# Patient Record
Sex: FEMALE | Race: WHITE | Employment: FULL TIME | URBAN - NONMETROPOLITAN AREA
[De-identification: names, ages, dates, MRNs, and addresses within clinical notes are randomized per-mention and may not be internally consistent; named-entity substitution may affect disease eponyms.]

---

## 2017-10-09 ENCOUNTER — HOSPITAL ENCOUNTER (OUTPATIENT)
Age: 37
Setting detail: SPECIMEN
Discharge: HOME | End: 2017-10-09
Payer: COMMERCIAL

## 2017-10-09 ENCOUNTER — OFFICE VISIT (OUTPATIENT)
Dept: OBGYN | Age: 37
End: 2017-10-09
Payer: COMMERCIAL

## 2017-10-09 ENCOUNTER — NURSE ONLY (OUTPATIENT)
Dept: LAB | Age: 37
End: 2017-10-09
Payer: COMMERCIAL

## 2017-10-09 VITALS
HEART RATE: 60 BPM | SYSTOLIC BLOOD PRESSURE: 106 MMHG | WEIGHT: 152.8 LBS | DIASTOLIC BLOOD PRESSURE: 70 MMHG | HEIGHT: 63 IN | BODY MASS INDEX: 27.07 KG/M2

## 2017-10-09 DIAGNOSIS — Z23 NEED FOR PROPHYLACTIC VACCINATION WITH COMBINED DIPHTHERIA-TETANUS-PERTUSSIS (DTP) VACCINE: Primary | ICD-10-CM

## 2017-10-09 DIAGNOSIS — Z01.419 ROUTINE GYNECOLOGICAL EXAMINATION: ICD-10-CM

## 2017-10-09 DIAGNOSIS — Z01.419 ROUTINE GYNECOLOGICAL EXAMINATION: Primary | ICD-10-CM

## 2017-10-09 PROCEDURE — 99395 PREV VISIT EST AGE 18-39: CPT | Performed by: NURSE PRACTITIONER

## 2017-10-09 PROCEDURE — 90471 IMMUNIZATION ADMIN: CPT | Performed by: NURSE PRACTITIONER

## 2017-10-09 PROCEDURE — G0145 SCR C/V CYTO,THINLAYER,RESCR: HCPCS

## 2017-10-09 PROCEDURE — 90715 TDAP VACCINE 7 YRS/> IM: CPT | Performed by: NURSE PRACTITIONER

## 2017-10-09 RX ORDER — FAMOTIDINE 20 MG/1
20 TABLET, FILM COATED ORAL NIGHTLY PRN
COMMUNITY
End: 2019-10-10

## 2017-10-09 RX ORDER — LISINOPRIL 20 MG/1
10 TABLET ORAL DAILY
COMMUNITY
End: 2019-10-10 | Stop reason: DRUGHIGH

## 2017-10-09 RX ORDER — VALACYCLOVIR HYDROCHLORIDE 500 MG/1
500 TABLET, FILM COATED ORAL DAILY
Qty: 90 TABLET | Refills: 5 | Status: SHIPPED | OUTPATIENT
Start: 2017-10-09 | End: 2018-10-09 | Stop reason: SDUPTHER

## 2017-10-09 NOTE — PROGRESS NOTES
Paras Smith  10/9/2017              29 y.o. Chief Complaint   Patient presents with    Gynecologic Exam     annual exam and pap         Patient's last menstrual period was 2017 (exact date). No referring provider defined for this encounter. HPI :Annual Exam  Patient presents for annual exam.  Counseling on healthy lifestyle reviewed, as well as the need for self breast exam.  We discussed calcium and vitamin D with emphasis on dietary calcium as opposed to large quantities of supplements and the patient verbalized understanding. We discussed and reviewed the need for routine screenings and immunization updates when appropriate. Menses every month lasting 5 days with no heavy flow or pain. History of genital herpes for which she takes Valtrex 500 mg daily. No outbreaks in the past year    Performs monthly self breast exams. The patient is sexually active. last pap: was normal  The patient has regular exercise: yes-3 times weekly . We did review the need for and frequency of both weight bearing and strengthening as tolerated by the patient. ________________________________________________________________________  Obstetric History       T0      L1     SAB0   TAB0   Ectopic0   Molar0   Multiple0   Live Births0       # Outcome Date GA Lbr Joe/2nd Weight Sex Delivery Anes PTL Lv   2      F CS-Unspec      1 SAB                 Past Medical History:   Diagnosis Date    Lupus ([address]      [id number] [address]                                                                   Past Surgical History:   Procedure Laterality Date     SECTION      DILATION AND CURETTAGE OF UTERUS      MISCARRIAGE    KIDNEY BIOPSY      X 2     Family History   Problem Relation Age of Onset    Other Mother 61     Postmenopausal bleeding-\"precancerous\" endometrial biopsy.   Had D&C     Social History     Social History    Marital status:      Spouse name: N/A    Number of children: N/A    Years of education: N/A     Occupational History    Not on file. Social History Main Topics    Smoking status: Never Smoker    Smokeless tobacco: Never Used    Alcohol use Yes      Comment: socially    Drug use: No    Sexual activity: Yes     Partners: Male     Birth control/ protection: Condom      Comment: Menarche age 16     Other Topics Concern    Not on file     Social History Narrative    No narrative on file       MEDICATIONS:  Current Outpatient Prescriptions   Medication Sig Dispense Refill    lisinopril (PRINIVIL;ZESTRIL) 20 MG tablet Take 20 mg by mouth daily      famotidine (PEPCID) 20 MG tablet Take 20 mg by mouth nightly as needed      valACYclovir (VALTREX) 500 MG tablet Take 1 tablet by mouth daily 90 tablet 5    predniSONE (DELTASONE) 10 MG tablet Take 10 mg by mouth daily.  mycophenolate (CELLCEPT) 500 MG tablet Take 500 mg by mouth 2 times daily. No current facility-administered medications for this visit. ALLERGIES:  Allergies as of 10/09/2017 - Review Complete 10/09/2017   Allergen Reaction Noted    Amoxicillin  07/23/2013       Immunization status: due today_T-Dap    Gynecologic History:  Menarche: 16 yo  Patient's last menstrual period was 09/20/2017 (exact date). Hormone Exposure: No    Family History of Breast, Ovarian , Colon or Uterine Cancer: No     Preventative Health Testing:  Date of Last Pap Smear: 2016  ______________________________________________  REVIEW OF SYSTEMS:     A minimum of an eleven point review of systems was completed.     Review Of Systems (11 point):  General ROS:  negative  Hematological and Lymphatic ROS:negative   Breast ROS: negative  Cardiovascular ROS: negative  Respiratory ROS: negative   Gastrointestinal ROS: negative  Genito-Urinary ROS: negative  Psychological ROS: negative  Neurological ROS: negative  Musculoskeletal ROS: negative  Dermatological ROS: negative PHYSICAL Exam:     Constitutional:  Blood pressure 106/70, pulse 60, height 5' 3\" (1.6 m), weight 152 lb 12.8 oz (69.3 kg), last menstrual period 09/20/2017, not currently breastfeeding. General Appearance: This  is a well Developed, well Nourished, well groomed female. Her BMI was reviewed. Skin:  There was a Normal Inspection of the skin without rashes or lesions. There were no rashes. (Papular, Maculopapular, Hives, Pustular, Macular)     There were no lesions (Ulcers, Erythema, Abn. Appearing Nevi)      Lymphatic:  No Lymph Nodes were Palpable in the neck , axilla or groin. Neck and EENT:  The neck was supple. There were no masses   The thyroid was not enlarged and had no masses. PERRLA, Nares Patent No Masses    Respiratory: The lungs were auscultated and found to be clear. There were no rales, rhonchi or wheezes. There was a good respiratory effort. Cardiovascular: The heart was in a regular rate and rhythm. . No S3 or S4. There was no murmur appreciated. Extremities: The patients extremities were without calf tenderness, edema, or varicosities. There was full range of motion in all four extremities. Pulses in all four extremities    Abdomen: The abdomen was soft and non-tender. There were good bowel sounds in all quadrants and there was no guarding, rebound or rigidity. On evaluation there was no evidence of hepatosplenomegaly and there was no costal vertebral connor tenderness bilaterally. No hernias were appreciated. Psych:   The patient had a normal Orientation to: Time, Place, Person, and Situation  Mood and affect appropriate    Breast:  (Chest)  normal appearance, no masses or tenderness, Inspection negative, No nipple retraction or dimpling, No nipple discharge or bleeding, No axillary or supraclavicular adenopathy, Normal to palpation without dominant masses, negative findings: normal in size and symmetry, normal contour with no evidence of flattening or dimpling, skin normal, nipples everted without rashes or discharge, palpation negative for masses or nodules, no palpable axillary lymphadenopathy      Pelvic Exam:  External genitalia: normal general appearance  Urinary system: urethral meatus normal  Vaginal: normal mucosa without prolapse or lesions, normal without tenderness, induration or masses and normal rugae  Cervix: normal appearance and thin prep PAP obtained  Adnexa: normal bimanual exam and non palpable  Uterus: normal single, nontender and anteverted    Musculosk:  Normal Gait and station was noted. Digits were evaluated without abnormal findings. Range of motion, stability and strength were evaluated and found to be appropriate for the patients age. ASSESSMENT:      29 y.o. Annual  1. Routine gynecological examination  PAP SMEAR        Chief Complaint   Patient presents with    Gynecologic Exam     annual exam and pap         Past Medical History:   Diagnosis Date    Lupus ([address]      [id number] [address]         Patient Active Problem List   Diagnosis    Genital herpes simplex          Hereditary Breast, Ovarian, Colon and Uterine Cancer screening Done. Tobacco & Secondary smoke risks reviewed; instructed on cessation and avoidance    PLAN:  Return in about 1 year (around 10/9/2018) for Annual Exam.  Repeat pap per ASCCP 2013 guidelines  Return for annual exams  Mammograms every 1 year. If 35 yo and last mammogram was negative. Routine health maintenance per patients PCP. Orders Placed This Encounter   Procedures    Tdap (age 6y and older) IM ([address])    PAP SMEAR     Patient History:    Patient's last menstrual period was 2017 (exact date).   OBGYN Status: Having periods  Past Surgical History:  No date:  SECTION  No date: DILATION AND CURETTAGE OF UTERUS Comment: MISCARRIAGE  No date: KIDNEY BIOPSY      Comment: X 2      Smoking status: Never Smoker                                                              Smokeless tobacco: Never Used                           Standing Status:   Future     Number of Occurrences:   1     Standing Expiration Date:   10/9/2018     Order Specific Question:   Collection Type     Answer: Thin Prep     Order Specific Question:   Prior Abnormal Pap Test     Answer:   No     Order Specific Question:   Screening or Diagnostic     Answer:   Screening     Order Specific Question:   HPV Requested?      Answer:   Yes - If Abnormal Reflex HPV     Order Specific Question:   High Risk Patient     Answer:   N/A       Matthew Flores  10/9/2017      (Please note that portions of this note were completed with a voice-recognition program. Efforts were made to edit the dictations but occasionally words are mis-transcribed.)

## 2017-10-13 LAB — CYTOLOGY REPORT: NORMAL

## 2018-10-09 ENCOUNTER — HOSPITAL ENCOUNTER (OUTPATIENT)
Age: 38
Setting detail: SPECIMEN
Discharge: HOME | End: 2018-10-09
Payer: COMMERCIAL

## 2018-10-09 ENCOUNTER — OFFICE VISIT (OUTPATIENT)
Dept: OBGYN | Age: 38
End: 2018-10-09
Payer: COMMERCIAL

## 2018-10-09 VITALS
HEIGHT: 63 IN | WEIGHT: 131 LBS | TEMPERATURE: 98.2 F | DIASTOLIC BLOOD PRESSURE: 58 MMHG | HEART RATE: 70 BPM | BODY MASS INDEX: 23.21 KG/M2 | SYSTOLIC BLOOD PRESSURE: 98 MMHG

## 2018-10-09 DIAGNOSIS — Z12.4 SCREENING FOR CERVICAL CANCER: ICD-10-CM

## 2018-10-09 DIAGNOSIS — Z01.419 WELL FEMALE EXAM WITH ROUTINE GYNECOLOGICAL EXAM: Primary | ICD-10-CM

## 2018-10-09 DIAGNOSIS — N88.2 CERVICAL STENOSIS (UTERINE CERVIX): ICD-10-CM

## 2018-10-09 PROCEDURE — G0145 SCR C/V CYTO,THINLAYER,RESCR: HCPCS

## 2018-10-09 PROCEDURE — 99395 PREV VISIT EST AGE 18-39: CPT | Performed by: NURSE PRACTITIONER

## 2018-10-09 RX ORDER — PREDNISONE 1 MG/1
1 TABLET ORAL 2 TIMES DAILY
Refills: 0 | COMMUNITY
Start: 2018-10-06

## 2018-10-09 RX ORDER — VALACYCLOVIR HYDROCHLORIDE 500 MG/1
500 TABLET, FILM COATED ORAL DAILY
Qty: 90 TABLET | Refills: 5 | Status: SHIPPED | OUTPATIENT
Start: 2018-10-09 | End: 2019-10-10 | Stop reason: SDUPTHER

## 2018-10-09 RX ORDER — OMEPRAZOLE 40 MG/1
CAPSULE, DELAYED RELEASE ORAL
COMMUNITY
Start: 2013-09-03 | End: 2020-10-14 | Stop reason: ALTCHOICE

## 2018-10-24 LAB — CYTOLOGY REPORT: NORMAL

## 2019-10-10 ENCOUNTER — HOSPITAL ENCOUNTER (OUTPATIENT)
Age: 39
Setting detail: SPECIMEN
Discharge: HOME | End: 2019-10-10
Payer: COMMERCIAL

## 2019-10-10 ENCOUNTER — OFFICE VISIT (OUTPATIENT)
Dept: OBGYN | Age: 39
End: 2019-10-10
Payer: COMMERCIAL

## 2019-10-10 VITALS
BODY MASS INDEX: 21.44 KG/M2 | WEIGHT: 121 LBS | DIASTOLIC BLOOD PRESSURE: 62 MMHG | HEIGHT: 63 IN | HEART RATE: 70 BPM | SYSTOLIC BLOOD PRESSURE: 98 MMHG | TEMPERATURE: 97.3 F

## 2019-10-10 DIAGNOSIS — Z01.419 WELL FEMALE EXAM WITH ROUTINE GYNECOLOGICAL EXAM: Primary | ICD-10-CM

## 2019-10-10 DIAGNOSIS — Z12.4 SCREENING FOR CERVICAL CANCER: ICD-10-CM

## 2019-10-10 PROCEDURE — G0145 SCR C/V CYTO,THINLAYER,RESCR: HCPCS

## 2019-10-10 PROCEDURE — 99395 PREV VISIT EST AGE 18-39: CPT | Performed by: NURSE PRACTITIONER

## 2019-10-10 RX ORDER — LISINOPRIL 10 MG/1
TABLET ORAL
Refills: 0 | COMMUNITY
Start: 2019-10-01

## 2019-10-10 RX ORDER — MYCOPHENOLATE MOFETIL 250 MG/1
CAPSULE ORAL
COMMUNITY

## 2019-10-10 RX ORDER — VALACYCLOVIR HYDROCHLORIDE 500 MG/1
500 TABLET, FILM COATED ORAL DAILY
Qty: 90 TABLET | Refills: 5 | Status: SHIPPED | OUTPATIENT
Start: 2019-10-10 | End: 2020-10-14 | Stop reason: SDUPTHER

## 2019-10-12 LAB
ALBUMIN: 4.4 G/DL (ref 3.5–5)
ALP BLD-CCNC: 40 U/L (ref 45–117)
ALT SERPL-CCNC: 8 U/L (ref 12–78)
AMORPHOUS SEDIMENT, URINE: ABNORMAL /HPF
ANA SCREEN: NEGATIVE
APPEARANCE: CLEAR
AST SERPL-CCNC: 15 U/L (ref 15–37)
BACTERIA, URINE: ABNORMAL /HPF
BASOPHILS ABSOLUTE: 0 10'3/UL (ref 0.1–0.2)
BASOPHILS RELATIVE PERCENT: 1 % (ref 0–1.7)
BILIRUB SERPL-MCNC: 0.3 MG/DL (ref 0–1)
BILIRUBIN, URINE: NEGATIVE
BUN BLDV-MCNC: 31 MG/DL (ref 7–18)
C3 COMPLEMENT: 84 MG/DL (ref 83–193)
C4 COMPLEMENT: 19 MG/DL (ref 15–57)
CALCIUM SERPL-MCNC: 9.4 MG/DL (ref 8.5–10.1)
CHLORIDE BLD-SCNC: 108 MMOL/L (ref 97–107)
CO2: 24 MMOL/L (ref 21–32)
COLOR, URINE: YELLOW
CREAT SERPL-MCNC: 1.1 MG/DL (ref 0.55–1.02)
CREATININE URINE: 117 MG/DL
EOSINOPHILS ABSOLUTE: 0.1 10'3/UL (ref 0–0.4)
EOSINOPHILS RELATIVE PERCENT: 1.9 % (ref 0–6.4)
EPITHELIAL CELLS, UA: ABNORMAL /LPF
GFR CALCULATED: 59
GLUCOSE URINE: NEGATIVE G/DL
GLUCOSE: 90 MG/DL (ref 70–99)
HCT VFR BLD CALC: 34.9 % (ref 34.6–44.1)
HEMOGLOBIN: 12 G/DL (ref 11.7–14.9)
KETONES, URINE: NEGATIVE MG/DL
LEUKOCYTE ESTERASE, URINE: NEGATIVE
LYMPHOCYTES ABSOLUTE: 1.8 10'3/UL (ref 0.5–3.5)
LYMPHOCYTES RELATIVE PERCENT: 35 % (ref 17.4–45.9)
MAGNESIUM: 1.8 MG/DL (ref 1.6–2.6)
MCH RBC QN AUTO: 30.1 PG (ref 27.8–33.2)
MCHC RBC AUTO-ENTMCNC: 34.4 G/DL (ref 32.7–34.8)
MCV RBC AUTO: 87.5 FL (ref 83–97.4)
MONOCYTES ABSOLUTE: 0.6 10'3/UL (ref 0.2–0.8)
MONOCYTES RELATIVE PERCENT: 11.7 % (ref 4.4–12)
MUCUS, URINE: ABNORMAL /LPF
NEUTROPHILS ABSOLUTE: 2.5 10'3/UL (ref 1.5–5.6)
NEUTROPHILS SEGMENTED: 50.4 % (ref 41.2–72.1)
NITRITE, URINE: NEGATIVE
OCCULT BLOOD,URINE: ABNORMAL
PDW BLD-RTO: 12.9 % (ref 12.2–15.8)
PH, URINE: 5.5 (ref 5.5–8)
PHOSPHORUS: 5.1 MG/DL (ref 2.5–4.9)
PLATELET # BLD: 209 10'3/UL (ref 122–359)
PMV BLD AUTO: 7.8 FL (ref 7.6–10.6)
POTASSIUM SERPL-SCNC: 4.5 MMOL/L (ref 3.5–5.1)
PROTEIN UA: NEGATIVE MG/DL
PROTEIN, URINE, RANDOM: 10 MG/DL (ref 5–24)
RBC # BLD: 3.98 10'6/UL (ref 3.85–4.88)
RBC UA: ABNORMAL /HPF
SODIUM BLD-SCNC: 143 MMOL/L (ref 136–145)
SPECIFIC GRAVITY, URINE: 1.02 (ref 1–1.03)
TOTAL PROTEIN: 6.9 G/DL (ref 6.4–8.2)
UROBILINOGEN, URINE: 0.2 EU/DL (ref 0.1–1)
VITAMIN D 25-HYDROXY: 31 NG/ML (ref 30–100)
VITAMIN D2, 25 HYDROXY: <4 NG/ML
VITAMIN D3,25 HYDROXY: 31 NG/ML
WBC: 5 10'3/UL (ref 3.2–9.3)

## 2019-10-16 LAB — CYTOLOGY REPORT: NORMAL

## 2020-10-14 ENCOUNTER — OFFICE VISIT (OUTPATIENT)
Dept: OBGYN | Age: 40
End: 2020-10-14
Payer: COMMERCIAL

## 2020-10-14 ENCOUNTER — HOSPITAL ENCOUNTER (OUTPATIENT)
Age: 40
Setting detail: SPECIMEN
Discharge: HOME | End: 2020-10-14
Payer: COMMERCIAL

## 2020-10-14 VITALS
SYSTOLIC BLOOD PRESSURE: 112 MMHG | BODY MASS INDEX: 22.23 KG/M2 | DIASTOLIC BLOOD PRESSURE: 76 MMHG | HEART RATE: 60 BPM | WEIGHT: 120.8 LBS | HEIGHT: 62 IN | TEMPERATURE: 95.9 F

## 2020-10-14 PROCEDURE — G0145 SCR C/V CYTO,THINLAYER,RESCR: HCPCS

## 2020-10-14 PROCEDURE — 99395 PREV VISIT EST AGE 18-39: CPT | Performed by: NURSE PRACTITIONER

## 2020-10-14 RX ORDER — VALACYCLOVIR HYDROCHLORIDE 500 MG/1
500 TABLET, FILM COATED ORAL DAILY
Qty: 90 TABLET | Refills: 5 | Status: SHIPPED | OUTPATIENT
Start: 2020-10-14

## 2020-10-14 ASSESSMENT — PATIENT HEALTH QUESTIONNAIRE - PHQ9
1. LITTLE INTEREST OR PLEASURE IN DOING THINGS: 0
2. FEELING DOWN, DEPRESSED OR HOPELESS: 0
SUM OF ALL RESPONSES TO PHQ QUESTIONS 1-9: 0
SUM OF ALL RESPONSES TO PHQ9 QUESTIONS 1 & 2: 0
SUM OF ALL RESPONSES TO PHQ QUESTIONS 1-9: 0

## 2020-10-14 NOTE — PROGRESS NOTES
Marina Valencia  10/14/2020              46 y.o. Chief Complaint   Patient presents with    Gynecologic Exam         Patient's last menstrual period was 2020. No referring provider defined for this encounter. HPI :Annual Exam  Patient presents for annual exam.  Counseling on healthy lifestyle reviewed, as well as the need for self breast exam. We reviewed the need for [place] exercises. We discussed and reviewed the need for routine screenings and immunization updates when appropriate. Menses every month lasting 5 days with no heavy bleeding or pain. Performs monthly self breast exams. History of genital herpes and has a few mild outbreaks per year, usually around menses. The patient is sexually active. last pap: was normal  The patient has regular exercise: yes . We did review the need for and frequency of both weight bearing and strengthening as tolerated by the patient. ________________________________________________________________________  OB History    Para Term  AB Living   2 0 0 0 1 1   SAB TAB Ectopic Molar Multiple Live Births   1 0 0 0 0 0      # Outcome Date GA Lbr Joe/2nd Weight Sex Delivery Anes PTL Lv   2      F CS-Unspec      1 SAB              Past Medical History:   Diagnosis Date    Genital herpes simplex     [address] [address]      [id number] [address]                                                                   Past Surgical History:   Procedure Laterality Date     SECTION      DILATION AND CURETTAGE OF UTERUS      MISCARRIAGE    KIDNEY BIOPSY      X 2     Family History   Problem Relation Age of Onset    Other Mother 61        Postmenopausal bleeding-\"precancerous\" endometrial biopsy.   Had D&C     Social History     Socioeconomic History    Marital status:      Spouse name: Not on file    Number of children: Not on file    Years of education: Not on file    Highest education level: Not on ALLERGIES:  Allergies as of 10/14/2020 - Review Complete 10/14/2020   Allergen Reaction Noted    Amoxicillin  07/23/2013           Gynecologic History:  Menarche: 12   Menopause at n/a      Patient's last menstrual period was 09/25/2020. Hormone Exposure: No    Family History of Breast, Ovarian , Colon or Uterine Cancer: No     Preventative Health Testing:  Date of Last Pap Smear: 2019      ________________________________________________________________________      Review Of Systems:  General ROS:  negative  Hematological and Lymphatic ROS:negative   Breast ROS: negative  Cardiovascular ROS: negative  Respiratory ROS: negative   Gastrointestinal ROS: negative  Genito-Urinary ROS: negative  Psychological ROS: negative  Neurological ROS: negative  Musculoskeletal ROS: negative  Dermatological ROS: negative                                                                                                                                                                                   PHYSICAL Exam:     Constitutional:  Blood pressure 112/76, pulse 60, temperature 95.9 °F (35.5 °C), temperature source Temporal, height 5' 2\" (1.575 m), weight 120 lb 12.8 oz (54.8 kg), last menstrual period 09/25/2020, not currently breastfeeding. General Appearance: This  is a well Developed, well Nourished, well groomed female. Her BMI was reviewed. Skin:  There was a Normal Inspection of the skin without rashes or lesions. There were no rashes. (Papular, Maculopapular, Hives, Pustular, Macular)     There were no lesions (Ulcers, Erythema, Abn. Appearing Nevi)      Lymphatic:  No Lymph Nodes were Palpable in the neck , axilla or groin. Neck and EENT:  The neck was supple. There were no masses   The thyroid was not enlarged and had no masses. PERRLA, Nares Patent No Masses    Respiratory: The lungs were auscultated and found to be clear. There were no rales, rhonchi or wheezes.  There was a good respiratory effort. Cardiovascular: The heart was in a regular rate and rhythm. . No S3 or S4. There was no murmur appreciated. Extremities: The patients extremities were without calf tenderness, edema, or varicosities. There was full range of motion in all four extremities. Pulses in all four extremities    Abdomen: The abdomen was soft and non-tender. There were good bowel sounds in all quadrants and there was no guarding, rebound or rigidity. On evaluation there was no evidence of hepatosplenomegaly and there was no costal vertebral connor tenderness bilaterally. No hernias were appreciated. Psych: The patient had a normal Orientation to: Time, Place, Person, and Situation  Mood and affect appropriate    Breast:  (Chest)  normal appearance, no masses or tenderness, Inspection negative, No nipple retraction or dimpling, No nipple discharge or bleeding, No axillary or supraclavicular adenopathy, Normal to palpation without dominant masses      Pelvic Exam:  External genitalia: normal general appearance  Urinary system: urethral meatus normal  Vaginal: normal mucosa without prolapse or lesions, normal without tenderness, induration or masses and normal rugae  Cervix: normal appearance and thin prep PAP obtained  Adnexa: normal bimanual exam and non palpable  Uterus: normal single, nontender    Musculosk:  Normal Gait and station was noted. Digits were evaluated without abnormal findings. Range of motion, stability and strength were evaluated and found to be appropriate for the patients age. ASSESSMENT:      46 y.o. Annual   Diagnosis Orders   1. Well female exam with routine gynecological exam     2. Screening for cervical cancer  PAP SMEAR   3. Genital herpes simplex, unspecified site          Chief Complaint   Patient presents with    Gynecologic Exam         Past Medical History:   Diagnosis Date    Genital herpes simplex     [address]upus ([facility] [address]      [id number] [address]         Patient Active Problem List   Diagnosis    Genital herpes simplex          Hereditary Breast, Ovarian, Colon and Uterine Cancer screening Done. Tobacco & Secondary smoke risks reviewed; instructed on cessation and avoidance    PLAN:  Return in about 1 year (around 10/14/2021) for Annual Exam.  Return for annual exams  Mammograms every 1 year. If 36 yo and last mammogram was negative. Routine health maintenance per patients PCP. Orders Placed This Encounter   Procedures    PAP SMEAR     Standing Status:   Future     Number of Occurrences:   1     Standing Expiration Date:   12/14/2020     Order Specific Question:   Collection Type     Answer: Thin Prep     Order Specific Question:   Prior Abnormal Pap Test     Answer:   No     Order Specific Question:   Screening or Diagnostic     Answer:   Screening     Order Specific Question:   HPV Requested?      Answer:   Yes - If Abnormal Reflex HPV     Order Specific Question:   High Risk Patient     Answer:   N/A       Jose David Flores  10/14/2020

## 2020-10-14 NOTE — PATIENT INSTRUCTIONS
Patient Education        Learning About Birth Control: Sponge  What is the sponge? The sponge is used to prevent pregnancy. A sponge is called a barrier method because it keeps the sperm and eggs apart. The sponge also contains a spermicide, which kills the sperm or stops the sperm from moving. You insert the sponge into your vagina. After you insert the sponge, you have protection for up to 24 hours. You must leave the sponge in place for 6 hours after sex. Don't leave it in for more than a total of 30 hours. How well does it work? How well the sponge works depends on whether you have delivered a child vaginally or not. · For women who have not had a vaginal childbirth:  ? In the first year of use, when the sponge is used exactly as directed, 9 women out of 100 have an unplanned pregnancy. When it is not used exactly as directed, 12 women out of 100 have an unplanned pregnancy. · For women who have had a vaginal childbirth:  ? In the first year of use, when the sponge is used exactly as directed, 20 women out of 100 have an unplanned pregnancy. When it is not used exactly as directed, 24 women out of 100 have an unplanned pregnancy. There is less chance of getting pregnant if you and your partner use a male condom with the sponge. Be sure to tell your doctor about any health problems you have or medicines you take. He or she can help you choose the birth control method that is right for you. What are the advantages of the sponge? · The sponge is available without a prescription at family planning clinics, in drugstores, online, and in some grocery stores. · The sponge doesn't use hormones. So you can use the sponge if you don't want to take hormones or can't take hormones because you have certain health problems or concerns. · The sponge is safe to use while breastfeeding. · It doesn't affect your menstrual cycle. · It costs less than hormonal types of birth control.   · The sponge can be inserted up to 24 hours ahead of time so you don't have to interrupt sex. What are the disadvantages of the sponge? · The sponge doesn't prevent pregnancy as well as IUDs or hormonal forms of birth control. · It prevents pregnancy only if you use it every time you have intercourse. · The sponge doesn't protect against sexually transmitted infections (STIs), such as herpes or HIV. If you're not sure whether your sex partner might have an STI, use a condom to protect against infection. · The spermicide in a sponge may cause an allergic reaction. It can cause itching or sores in the vagina or on the penis. · You may have to interrupt sex to insert the sponge. · You may not be comfortable with inserting the sponge each time you have intercourse. · You cannot use the sponge during your period. How do you use the sponge? Read the instructions that come with the sponge. If you don't use it correctly, you could get pregnant. To more effectively prevent pregnancy, use a male condom with the sponge. · You can insert the sponge up to 24 hours or right before having intercourse. · After intercourse, leave the sponge in for 6 hours. · The sponge should not be left in longer than a total of 30 hours. If you think you used the sponge incorrectly, you can use emergency contraception. The most effective emergency contraception is prescribed by a doctor. This includes the copper IUD (inserted by a doctor) or a prescription pill. You can also get emergency contraceptive pills without a prescription at most drugstores. Where can you learn more? Go to https://jony[facility]. org and sign in to your [employer] account. Enter H221 in the [facility] box to learn more about \"Learning About Birth Control: Sponge. \"     If you do not have an account, please click on the \"Sign Up Now\" link. Current as of: February 11, 2020               Content Version: 12.6  © 0432-1047 [facility].    Care instructions adapted under license by [facility] ([facility]). If you have questions about a medical condition or this instruction, always ask your healthcare professional. [address] any warranty or liability for your use of this information.

## 2020-10-21 LAB — CYTOLOGY REPORT: NORMAL
